# Patient Record
Sex: MALE
[De-identification: names, ages, dates, MRNs, and addresses within clinical notes are randomized per-mention and may not be internally consistent; named-entity substitution may affect disease eponyms.]

---

## 2020-12-10 PROBLEM — Z00.00 ENCOUNTER FOR PREVENTIVE HEALTH EXAMINATION: Status: ACTIVE | Noted: 2020-12-10

## 2020-12-11 ENCOUNTER — APPOINTMENT (OUTPATIENT)
Dept: ORTHOPEDIC SURGERY | Facility: CLINIC | Age: 35
End: 2020-12-11

## 2020-12-24 ENCOUNTER — APPOINTMENT (OUTPATIENT)
Dept: ORTHOPEDIC SURGERY | Facility: CLINIC | Age: 35
End: 2020-12-24

## 2020-12-24 VITALS — TEMPERATURE: 97.8 F

## 2020-12-24 NOTE — HISTORY OF PRESENT ILLNESS
[de-identified] : Patient is here for LBP\par \par The patient's past medical history, past surgical history, medications and allergies were reviewed by me today and documented accordingly. In addition, the patient's family and social history, which were noncontributory to this visit, were reviewed also. Intake form was reviewed. The patient has no family history of arthritis.

## 2021-02-04 ENCOUNTER — APPOINTMENT (OUTPATIENT)
Dept: ORTHOPEDIC SURGERY | Facility: CLINIC | Age: 36
End: 2021-02-04

## 2021-02-04 ENCOUNTER — NON-APPOINTMENT (OUTPATIENT)
Age: 36
End: 2021-02-04

## 2021-12-15 ENCOUNTER — NON-APPOINTMENT (OUTPATIENT)
Age: 36
End: 2021-12-15

## 2021-12-17 ENCOUNTER — APPOINTMENT (OUTPATIENT)
Dept: UROLOGY | Facility: CLINIC | Age: 36
End: 2021-12-17
Payer: MEDICAID

## 2021-12-17 VITALS
HEIGHT: 71 IN | DIASTOLIC BLOOD PRESSURE: 95 MMHG | HEART RATE: 84 BPM | BODY MASS INDEX: 28.42 KG/M2 | SYSTOLIC BLOOD PRESSURE: 155 MMHG | TEMPERATURE: 97 F | WEIGHT: 203 LBS

## 2021-12-17 DIAGNOSIS — F17.210 NICOTINE DEPENDENCE, CIGARETTES, UNCOMPLICATED: ICD-10-CM

## 2021-12-17 DIAGNOSIS — Z72.89 OTHER PROBLEMS RELATED TO LIFESTYLE: ICD-10-CM

## 2021-12-17 DIAGNOSIS — N52.9 MALE ERECTILE DYSFUNCTION, UNSPECIFIED: ICD-10-CM

## 2021-12-17 PROCEDURE — 99204 OFFICE O/P NEW MOD 45 MIN: CPT | Mod: 25

## 2021-12-17 PROCEDURE — 99406 BEHAV CHNG SMOKING 3-10 MIN: CPT

## 2021-12-17 RX ORDER — SILDENAFIL 20 MG/1
20 TABLET ORAL DAILY
Qty: 20 | Refills: 1 | Status: ACTIVE | COMMUNITY
Start: 2021-12-17 | End: 1900-01-01

## 2021-12-17 RX ORDER — MULTIVIT-MIN/IRON/FOLIC ACID/K 18-600-40
CAPSULE ORAL
Refills: 0 | Status: ACTIVE | COMMUNITY

## 2021-12-17 NOTE — ASSESSMENT
[FreeTextEntry1] : Mr. Abdullahi is a very pleasant 36 year old man here today for ED.\par He reports this started 2 years ago and that "sometimes it's okay, sometimes not."\par He reports that sometimes the strength of his erection isn't as good as it is then other times.\par He is able to have penetrative sex with it and masturbate to orgasm.\par Reports he has tried Cialis 5 mg with success, but it takes a while for it to work.\par He denies any hematuria, dysuria, or painful erections.\par He denies any recent life stressors.\par Reports monogamous relationship.\par Reports he smokes 3-4 cigarettes a day.\par Testosterone, TSH, Sex Hormone binding globulin, LH, PL, HgbA1c \par Stop Cialis, start sildenafil 20 mg.\par I discussed the risks, benefits, alternatives, and possible side effects of Viagra (sildenafil) therapy with the patient, including but not limited to headache, flushing, upset stomach, blurry vision, change in color vision, vision loss, and priapism with the patient.\par Follow up in 1 month.\par Patient was counseled on smoking cessation for 5 minutes.  We discussed various health benefits of quitting.  We discussed the potential Urologic implications of smoking, including an increased risk of bladder and upper tract urothelial carcinoma, kidney cancer, and a potential link to erectile dysfunction.\par

## 2021-12-17 NOTE — HISTORY OF PRESENT ILLNESS
[Currently Experiencing ___] :  [unfilled] [Erectile Dysfunction] : Erectile Dysfunction [None] : None [FreeTextEntry1] : Mr. Abdullahi is a very pleasant 36 year old man here today for ED.\par He reports this started 2 years ago and that "sometimes it's okay, sometimes not."\par He reports that sometimes the strength of his erection isn't as good as it is then other times.\par He is able to have penetrative sex with it and masturbate to orgasm.\par Reports he has tried Cialis 5 mg with success, but it takes a while for it to work.\par He denies any hematuria, dysuria, or painful erections.\par He denies any recent life stressors.\par Reports monogamous relationship.\par Reports he smokes 3-4 cigarettes a day.

## 2021-12-17 NOTE — PHYSICAL EXAM
[General Appearance - Well Developed] : well developed [General Appearance - Well Nourished] : well nourished [Normal Appearance] : normal appearance [Well Groomed] : well groomed [General Appearance - In No Acute Distress] : no acute distress [Edema] : no peripheral edema [Respiration, Rhythm And Depth] : normal respiratory rhythm and effort [Exaggerated Use Of Accessory Muscles For Inspiration] : no accessory muscle use [Abdomen Soft] : soft [Abdomen Tenderness] : non-tender [Costovertebral Angle Tenderness] : no ~M costovertebral angle tenderness [Normal Station and Gait] : the gait and station were normal for the patient's age [] : no rash [No Focal Deficits] : no focal deficits [Oriented To Time, Place, And Person] : oriented to person, place, and time [Affect] : the affect was normal [Mood] : the mood was normal [Not Anxious] : not anxious [No Palpable Adenopathy] : no palpable adenopathy [Scrotum] : the scrotum was normal

## 2021-12-20 LAB
ESTIMATED AVERAGE GLUCOSE: 103 MG/DL
HBA1C MFR BLD HPLC: 5.2 %
LH SERPL-ACNC: 3.4 IU/L
PROLACTIN SERPL-MCNC: 9 NG/ML
TSH SERPL-ACNC: 1.85 UIU/ML

## 2021-12-21 LAB
TESTOST BND SERPL-MCNC: 8.7 PG/ML
TESTOSTERONE TOTAL S: 227 NG/DL

## 2021-12-22 LAB — SHBG SERPL-SCNC: 16.2 NMOL/L

## 2022-01-18 ENCOUNTER — APPOINTMENT (OUTPATIENT)
Dept: UROLOGY | Facility: CLINIC | Age: 37
End: 2022-01-18